# Patient Record
Sex: FEMALE | Race: BLACK OR AFRICAN AMERICAN
[De-identification: names, ages, dates, MRNs, and addresses within clinical notes are randomized per-mention and may not be internally consistent; named-entity substitution may affect disease eponyms.]

---

## 2018-05-17 ENCOUNTER — HOSPITAL ENCOUNTER (INPATIENT)
Dept: HOSPITAL 26 - MED | Age: 58
LOS: 3 days | Discharge: HOME | DRG: 816 | End: 2018-05-20
Payer: MEDICAID

## 2018-05-17 VITALS — SYSTOLIC BLOOD PRESSURE: 198 MMHG | DIASTOLIC BLOOD PRESSURE: 81 MMHG

## 2018-05-17 VITALS — HEIGHT: 63 IN | WEIGHT: 108 LBS | BODY MASS INDEX: 19.14 KG/M2

## 2018-05-17 VITALS — DIASTOLIC BLOOD PRESSURE: 82 MMHG | SYSTOLIC BLOOD PRESSURE: 109 MMHG

## 2018-05-17 DIAGNOSIS — N18.6: ICD-10-CM

## 2018-05-17 DIAGNOSIS — I16.0: ICD-10-CM

## 2018-05-17 DIAGNOSIS — G92: ICD-10-CM

## 2018-05-17 DIAGNOSIS — Z85.3: ICD-10-CM

## 2018-05-17 DIAGNOSIS — I35.1: ICD-10-CM

## 2018-05-17 DIAGNOSIS — N28.1: ICD-10-CM

## 2018-05-17 DIAGNOSIS — Z92.3: ICD-10-CM

## 2018-05-17 DIAGNOSIS — I27.20: ICD-10-CM

## 2018-05-17 DIAGNOSIS — Z98.51: ICD-10-CM

## 2018-05-17 DIAGNOSIS — Z77.22: ICD-10-CM

## 2018-05-17 DIAGNOSIS — Z82.5: ICD-10-CM

## 2018-05-17 DIAGNOSIS — E44.0: ICD-10-CM

## 2018-05-17 DIAGNOSIS — D63.8: ICD-10-CM

## 2018-05-17 DIAGNOSIS — J44.1: ICD-10-CM

## 2018-05-17 DIAGNOSIS — N39.0: ICD-10-CM

## 2018-05-17 DIAGNOSIS — K21.9: ICD-10-CM

## 2018-05-17 DIAGNOSIS — N17.0: ICD-10-CM

## 2018-05-17 DIAGNOSIS — G25.81: ICD-10-CM

## 2018-05-17 DIAGNOSIS — Z80.1: ICD-10-CM

## 2018-05-17 DIAGNOSIS — J96.00: ICD-10-CM

## 2018-05-17 DIAGNOSIS — E83.39: ICD-10-CM

## 2018-05-17 DIAGNOSIS — D64.9: ICD-10-CM

## 2018-05-17 DIAGNOSIS — I07.1: ICD-10-CM

## 2018-05-17 DIAGNOSIS — E83.41: ICD-10-CM

## 2018-05-17 DIAGNOSIS — I50.43: ICD-10-CM
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
## 2018-05-17 DIAGNOSIS — I13.2: ICD-10-CM

## 2018-05-17 DIAGNOSIS — I42.9: ICD-10-CM

## 2018-05-17 DIAGNOSIS — T40.5X1A: Primary | ICD-10-CM

## 2018-05-17 DIAGNOSIS — Z90.12: ICD-10-CM

## 2018-05-17 DIAGNOSIS — I10: ICD-10-CM

## 2018-05-17 DIAGNOSIS — Z99.2: ICD-10-CM

## 2018-05-17 DIAGNOSIS — E87.8: ICD-10-CM

## 2018-05-17 DIAGNOSIS — Z82.49: ICD-10-CM

## 2018-05-17 DIAGNOSIS — E87.6: ICD-10-CM

## 2018-05-17 DIAGNOSIS — E87.5: ICD-10-CM

## 2018-05-17 DIAGNOSIS — I34.0: ICD-10-CM

## 2018-05-17 DIAGNOSIS — I31.3: ICD-10-CM

## 2018-05-17 DIAGNOSIS — Z99.81: ICD-10-CM

## 2018-05-17 DIAGNOSIS — J44.9: ICD-10-CM

## 2018-05-17 DIAGNOSIS — Z90.49: ICD-10-CM

## 2018-05-17 LAB
ALBUMIN FLD-MCNC: 3.8 G/DL (ref 3.4–5)
ANION GAP SERPL CALCULATED.3IONS-SCNC: 7.6 MMOL/L (ref 8–16)
APPEARANCE UR: CLEAR
AST SERPL-CCNC: 15 U/L (ref 15–37)
BARBITURATES UR QL SCN: (no result) NG/ML
BASOPHILS # BLD AUTO: 0 K/UL (ref 0–0.22)
BASOPHILS NFR BLD AUTO: 0.2 % (ref 0–2)
BENZODIAZ UR QL SCN: (no result) NG/ML
BILIRUB SERPL-MCNC: 0.5 MG/DL (ref 0–1)
BILIRUB UR QL STRIP: NEGATIVE
BUN SERPL-MCNC: 16 MG/DL (ref 7–18)
BZE UR QL SCN: (no result) NG/ML
CANNABINOIDS UR QL SCN: (no result) NG/ML
CHLORIDE SERPL-SCNC: 97 MMOL/L (ref 98–107)
CO2 SERPL-SCNC: 38.4 MMOL/L (ref 21–32)
COLOR UR: YELLOW
CREAT SERPL-MCNC: 4.6 MG/DL (ref 0.6–1.3)
EOSINOPHIL # BLD AUTO: 0.2 K/UL (ref 0–0.4)
EOSINOPHIL NFR BLD AUTO: 2.6 % (ref 0–4)
ERYTHROCYTE [DISTWIDTH] IN BLOOD BY AUTOMATED COUNT: 19.7 % (ref 11.6–13.7)
GFR SERPL CREATININE-BSD FRML MDRD: 13 ML/MIN (ref 90–?)
GLUCOSE SERPL-MCNC: 106 MG/DL (ref 74–106)
GLUCOSE UR STRIP-MCNC: (no result) MG/DL
HCT VFR BLD AUTO: 36.7 % (ref 36–48)
HGB BLD-MCNC: 11.7 G/DL (ref 12–16)
HGB UR QL STRIP: (no result)
LEUKOCYTE ESTERASE UR QL STRIP: (no result)
LYMPHOCYTES # BLD AUTO: 1 K/UL (ref 2.5–16.5)
LYMPHOCYTES NFR BLD AUTO: 16.5 % (ref 20.5–51.1)
MCH RBC QN AUTO: 28 PG (ref 27–31)
MCHC RBC AUTO-ENTMCNC: 32 G/DL (ref 33–37)
MCV RBC AUTO: 87.9 FL (ref 80–94)
MONOCYTES # BLD AUTO: 0.4 K/UL (ref 0.8–1)
MONOCYTES NFR BLD AUTO: 6.2 % (ref 1.7–9.3)
NEUTROPHILS # BLD AUTO: 4.4 K/UL (ref 1.8–7.7)
NEUTROPHILS NFR BLD AUTO: 74.5 % (ref 42.2–75.2)
NITRITE UR QL STRIP: NEGATIVE
OPIATES UR QL SCN: (no result) NG/ML
PCP UR QL SCN: (no result) NG/ML
PH UR STRIP: 8.5 [PH] (ref 5–9)
PLATELET # BLD AUTO: 374 K/UL (ref 140–450)
POTASSIUM SERPL-SCNC: 3 MMOL/L (ref 3.5–5.1)
PROTHROMBIN TIME: 9.9 SECS (ref 10.8–13.4)
RBC # BLD AUTO: 4.17 MIL/UL (ref 4.2–5.4)
RBC #/AREA URNS HPF: (no result) /HPF (ref 0–5)
SODIUM SERPL-SCNC: 140 MMOL/L (ref 136–145)
WBC # BLD AUTO: 5.9 K/UL (ref 4.8–10.8)
WBC,URINE: (no result) /HPF (ref 0–5)

## 2018-05-17 NOTE — NUR
ADMITTED 57 F FROM ER TO [facility] BY DEB. PT CC IS SOB SECONDARY TO CHF EXACERBATION. PT VS 
NOTED AS /81, , RR 18, O2 SAT 94% ON NC @3LPM., T 98.8. PT IS ON TELE MONITOR. 
PT AOX4, AMBULATORY WITH NO COMPLAIN OF ANY DISCOMFORT , NO PAIN. HAS LFT UA AV SHUNT . HAS 
IV ACCESS RT WRIST 20G. LINE CLEAR AND PATENT. ORIENTED PT TO HOSPITAL ROUTINE. PLAN OF CARE 
DISCUSSED, VERBALIZED UNDERSTANDING. 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 
BED AT LOW POSITION, CALL LIGHT WITHIN REACH. WILL CONTINUE TO MONITOR.

## 2018-05-17 NOTE — NUR
TELE NURSE SITAL AND BLUE MADE AWARE OF BP. PT ASYMPTOMATIC AT THIS TIME, AOX4. 
ER MD DR. JULES AND DR. DIAZ AWARE. OK TO GO TO TELE.

## 2018-05-17 NOTE — NUR
Patient will be admitted to care of DR. KAPADIA. Admited to TELE.  Will go to 
room 114. Belongings list completed.  Report to BLUE AND SITAL RN.

## 2018-05-17 NOTE — NUR
57Y/F  BIBA FROM DIALYSIS CENTER FOR ACUTE ONSET OF SOB. AWAKE AND ALERT ON 
ARRIVAL. TO BED 11.AAOX4 WITH EVEN AND STEADY GAIT; VSS; PATIENT POSITIONED FOR 
COMFORT; HOB ELEVATED; BEDRAILS UP X1; BED DOWN. ER MD MADE AWARE OF PT STATUS.

## 2018-05-18 VITALS — DIASTOLIC BLOOD PRESSURE: 77 MMHG | SYSTOLIC BLOOD PRESSURE: 178 MMHG

## 2018-05-18 VITALS — DIASTOLIC BLOOD PRESSURE: 79 MMHG | SYSTOLIC BLOOD PRESSURE: 188 MMHG

## 2018-05-18 VITALS — DIASTOLIC BLOOD PRESSURE: 85 MMHG | SYSTOLIC BLOOD PRESSURE: 184 MMHG

## 2018-05-18 VITALS — SYSTOLIC BLOOD PRESSURE: 177 MMHG | DIASTOLIC BLOOD PRESSURE: 96 MMHG

## 2018-05-18 VITALS — SYSTOLIC BLOOD PRESSURE: 170 MMHG | DIASTOLIC BLOOD PRESSURE: 83 MMHG

## 2018-05-18 LAB
AMYLASE SERPL-CCNC: 71 U/L (ref 25–115)
ANION GAP SERPL CALCULATED.3IONS-SCNC: 13.7 MMOL/L (ref 8–16)
BASOPHILS # BLD AUTO: 0 K/UL (ref 0–0.22)
BASOPHILS NFR BLD AUTO: 0.1 % (ref 0–2)
BUN SERPL-MCNC: 26 MG/DL (ref 7–18)
CHLORIDE SERPL-SCNC: 100 MMOL/L (ref 98–107)
CHOLEST/HDLC SERPL: 2.2 {RATIO} (ref 1–4.5)
CO2 SERPL-SCNC: 32.6 MMOL/L (ref 21–32)
CREAT SERPL-MCNC: 6.1 MG/DL (ref 0.6–1.3)
EOSINOPHIL # BLD AUTO: 0 K/UL (ref 0–0.4)
EOSINOPHIL NFR BLD AUTO: 0 % (ref 0–4)
ERYTHROCYTE [DISTWIDTH] IN BLOOD BY AUTOMATED COUNT: 19.2 % (ref 11.6–13.7)
GFR SERPL CREATININE-BSD FRML MDRD: 9 ML/MIN (ref 90–?)
GLUCOSE SERPL-MCNC: 141 MG/DL (ref 74–106)
HCT VFR BLD AUTO: 29.7 % (ref 36–48)
HDLC SERPL-MCNC: 72 MG/DL (ref 40–60)
HGB BLD-MCNC: 9.4 G/DL (ref 12–16)
LDLC SERPL CALC-MCNC: 61 MG/DL (ref 60–100)
LIPASE SERPL-CCNC: 121 U/L (ref 73–393)
LYMPHOCYTES # BLD AUTO: 0.8 K/UL (ref 2.5–16.5)
LYMPHOCYTES NFR BLD AUTO: 12.8 % (ref 20.5–51.1)
MAGNESIUM SERPL-MCNC: 2.1 MG/DL (ref 1.8–2.4)
MAGNESIUM SERPL-MCNC: 2.3 MG/DL (ref 1.8–2.4)
MCH RBC QN AUTO: 28 PG (ref 27–31)
MCHC RBC AUTO-ENTMCNC: 32 G/DL (ref 33–37)
MCV RBC AUTO: 88.2 FL (ref 80–94)
MONOCYTES # BLD AUTO: 0.4 K/UL (ref 0.8–1)
MONOCYTES NFR BLD AUTO: 6.5 % (ref 1.7–9.3)
NEUTROPHILS # BLD AUTO: 5.1 K/UL (ref 1.8–7.7)
NEUTROPHILS NFR BLD AUTO: 80.6 % (ref 42.2–75.2)
PHOSPHATE SERPL-MCNC: 5 MG/DL (ref 2.5–4.9)
PHOSPHATE SERPL-MCNC: 5.5 MG/DL (ref 2.5–4.9)
PLATELET # BLD AUTO: 323 K/UL (ref 140–450)
POTASSIUM SERPL-SCNC: 4.3 MMOL/L (ref 3.5–5.1)
PROTHROMBIN TIME: 11.1 SECS (ref 10.8–13.4)
RBC # BLD AUTO: 3.37 MIL/UL (ref 4.2–5.4)
SODIUM SERPL-SCNC: 142 MMOL/L (ref 136–145)
TRIGL SERPL-MCNC: 125 MG/DL (ref 30–150)
TSH SERPL DL<=0.05 MIU/L-ACNC: 0.09 UIU/ML (ref 0.34–3.74)
WBC # BLD AUTO: 6.3 K/UL (ref 4.8–10.8)

## 2018-05-18 RX ADMIN — IPRATROPIUM BROMIDE AND ALBUTEROL SULFATE SCH ML: .5; 3 SOLUTION RESPIRATORY (INHALATION) at 06:59

## 2018-05-18 RX ADMIN — IPRATROPIUM BROMIDE AND ALBUTEROL SULFATE SCH ML: .5; 3 SOLUTION RESPIRATORY (INHALATION) at 01:15

## 2018-05-18 RX ADMIN — BUDESONIDE SCH MG: 0.25 SUSPENSION RESPIRATORY (INHALATION) at 06:59

## 2018-05-18 RX ADMIN — Medication SCH TAB: at 09:34

## 2018-05-18 RX ADMIN — IPRATROPIUM BROMIDE AND ALBUTEROL SULFATE PRN ML: .5; 3 SOLUTION RESPIRATORY (INHALATION) at 17:03

## 2018-05-18 RX ADMIN — Medication SCH MG: at 09:32

## 2018-05-18 RX ADMIN — CALCIUM ACETATE SCH MG: 667 CAPSULE ORAL at 09:32

## 2018-05-18 RX ADMIN — HEPARIN SODIUM SCH MLS/HR: 5000 INJECTION, SOLUTION INTRAVENOUS at 13:11

## 2018-05-18 RX ADMIN — HEPARIN SODIUM SCH MLS/HR: 5000 INJECTION, SOLUTION INTRAVENOUS at 19:01

## 2018-05-18 RX ADMIN — Medication SCH EACH: at 09:33

## 2018-05-18 RX ADMIN — IPRATROPIUM BROMIDE AND ALBUTEROL SULFATE SCH ML: .5; 3 SOLUTION RESPIRATORY (INHALATION) at 19:24

## 2018-05-18 RX ADMIN — CALCIUM ACETATE SCH MG: 667 CAPSULE ORAL at 17:40

## 2018-05-18 RX ADMIN — HEPARIN SODIUM SCH MLS/HR: 5000 INJECTION, SOLUTION INTRAVENOUS at 00:45

## 2018-05-18 RX ADMIN — Medication SCH MG: at 20:28

## 2018-05-18 RX ADMIN — BUDESONIDE SCH MG: 0.25 SUSPENSION RESPIRATORY (INHALATION) at 19:24

## 2018-05-18 RX ADMIN — MINOXIDIL SCH MG: 10 TABLET ORAL at 09:34

## 2018-05-18 RX ADMIN — CALCIUM ACETATE SCH MG: 667 CAPSULE ORAL at 13:12

## 2018-05-18 RX ADMIN — PANTOPRAZOLE SODIUM SCH MG: 40 TABLET, DELAYED RELEASE ORAL at 09:32

## 2018-05-18 RX ADMIN — FUROSEMIDE SCH MG: 10 INJECTION, SOLUTION INTRAMUSCULAR; INTRAVENOUS at 13:12

## 2018-05-18 RX ADMIN — IPRATROPIUM BROMIDE AND ALBUTEROL SULFATE SCH ML: .5; 3 SOLUTION RESPIRATORY (INHALATION) at 12:42

## 2018-05-18 NOTE — NUR
SPOKE TO JUANIS GONZALEZ AND INFORMED HER ABOUT THE HEMODIALYSIS ORDER FOR THE PATIENT TOMORROW. PER 
JUANIS GONZALEZ, SHE WILL BE HERE TOMORROW BY 0900

## 2018-05-18 NOTE — NUR
PT B/P DURING RETAKE /90. AT THIS TIME PT HAD BEEN UP AND BRUSHING HER TEETH. B/P 
RETAKEN 10 MINUTES LATER AFTER PT HAD CALEB RESTING IN BED, AND RETAKE /81. ATTENDING 
DR NOTIFIED AND ORDERED ANOTHER CATAPRES 0.1MG TO BE GIVEN NOW. WILL CONTINUE TO MONITOR 
B/P. PT IS ASYMPTOMATIC AT THIS TIME WITH NO C/O VOICED.

## 2018-05-18 NOTE — NUR
PATIENT BACK TO THE UNIT. CT NOT DONE AT THIS TIME. PATIENT IS HAVING DIFFICULTY LAYING 
FLAT. NOTIFIED DR GUZMAN AND DR ARORA

## 2018-05-18 NOTE — NUR
ADMINISTERED LISINOPRIL 40 MG ONE TIME DOSE FOR /79,  TO PT AS ORDERED. PT LYING 
ON BED, AT REST. NO SIGN OF DISTRESS. WILL CONTINUE TO MONITOR PT.

## 2018-05-18 NOTE — NUR
PT LATEST /77, , RR 16, O2 SAT 98% ON NC@3LPM, T 98.5. PT RESTING ON BED. PT 
ASKING FOR BREAKFAST , PT NOTIFIED ABOUT THE TIME. PT STABLE AT THIS TIME. WILL CONTINUE TO 
MONITOR.

## 2018-05-18 NOTE — NUR
FAXED INITIAL REVIEW TO [facility]   [phone]   PHONE [phone]

FAXED INITIAL REVIEW TO [facility]  [phone]   PHONE [place] [phone]

## 2018-05-18 NOTE — NUR
RECEIVED REPORT FROM VERENICE ARVIZU AT BEDSIDE. PT SITTING UP IN BED AND RECEIVING BREATHING 
TREATMENTS AT THIS TIME. PT HAS HEPARIN GTT RUNNING AT 1000 UNITS AN HOUR VIA R FARM 20 G IV 
SITE.. PT HAS LFET ARM AV SHUNT WITH THRILL AND BRUIT FELT. PT DUE FOR DIALYSIS TOMORROW 
MORNING. PT ON 2L O2 VIA N/C. PT HAS EVEN AND UNLABORED RESPIRATIONS. SHE HAD NO C/O VOICED 
AT THIS TIME. BED LOW SIDE RAILS UP AND CALL BELL IN REACH.

## 2018-05-18 NOTE — NUR
BP STILL ELEVATED 181/83 ,HR-111 AFTER THE LISINOPRIL. DR. DIAZ MADE AWARE . ALSO AWARE 
ABOUT THE TROPONIN  TRENDING DOWN 0.059 .

## 2018-05-18 NOTE — NUR
PT COMPLAINING OF TOO MUCH PAIN ON RT FA 20G DUE TO K RIDER . REFUSED SECOND BAG OF 20 MEQ 
AT THIS TIME. DR DIAZ MADE AWARE. HE SAYS OKAY TO HOLD IT FOR NOW. MIGHT GIVE IN K 
RIDER LATER IN MORNING WITH LIDOCAINE. HE WAS ALSO MADE AWARE OF /83,  AFTER 
CLONIDINE. WILL DO ORDER.

## 2018-05-18 NOTE — NUR
RECEIVED PATIENT REPORT AT BEDSIDE. PATIENT IS ASLEEP BUT AROUSABLE. NO S/S OF DISTRESS 
NOTED. PATIENT ON 2L O2. NO SOB. PATIENT ON HEPARIN DRIP INFUSING AT 6ML/HR. AV SHUNT NOTED 
TO THE LEFT UPPER ARM. THRILL AND BRUIT NOTED. PATIENT ON TELE MONITORING. BED LOWERED WITH 
CALL LIGHT WITHIN REACH. WILL CONTINUE TO MONITOR

## 2018-05-19 VITALS — SYSTOLIC BLOOD PRESSURE: 147 MMHG | DIASTOLIC BLOOD PRESSURE: 76 MMHG

## 2018-05-19 VITALS — SYSTOLIC BLOOD PRESSURE: 126 MMHG | DIASTOLIC BLOOD PRESSURE: 51 MMHG

## 2018-05-19 VITALS — DIASTOLIC BLOOD PRESSURE: 84 MMHG | SYSTOLIC BLOOD PRESSURE: 178 MMHG

## 2018-05-19 VITALS — SYSTOLIC BLOOD PRESSURE: 174 MMHG | DIASTOLIC BLOOD PRESSURE: 85 MMHG

## 2018-05-19 VITALS — SYSTOLIC BLOOD PRESSURE: 150 MMHG | DIASTOLIC BLOOD PRESSURE: 75 MMHG

## 2018-05-19 VITALS — DIASTOLIC BLOOD PRESSURE: 82 MMHG | SYSTOLIC BLOOD PRESSURE: 164 MMHG

## 2018-05-19 VITALS — SYSTOLIC BLOOD PRESSURE: 163 MMHG | DIASTOLIC BLOOD PRESSURE: 72 MMHG

## 2018-05-19 LAB
ANION GAP SERPL CALCULATED.3IONS-SCNC: 14.7 MMOL/L (ref 8–16)
BASOPHILS # BLD AUTO: 0 K/UL (ref 0–0.22)
BASOPHILS NFR BLD AUTO: 0.1 % (ref 0–2)
BUN SERPL-MCNC: 49 MG/DL (ref 7–18)
CHLORIDE SERPL-SCNC: 96 MMOL/L (ref 98–107)
CO2 SERPL-SCNC: 32.8 MMOL/L (ref 21–32)
CREAT SERPL-MCNC: 7.9 MG/DL (ref 0.6–1.3)
EOSINOPHIL # BLD AUTO: 0 K/UL (ref 0–0.4)
EOSINOPHIL NFR BLD AUTO: 0 % (ref 0–4)
ERYTHROCYTE [DISTWIDTH] IN BLOOD BY AUTOMATED COUNT: 19.2 % (ref 11.6–13.7)
GFR SERPL CREATININE-BSD FRML MDRD: 7 ML/MIN (ref 90–?)
GLUCOSE SERPL-MCNC: 145 MG/DL (ref 74–106)
HCT VFR BLD AUTO: 32 % (ref 36–48)
HGB BLD-MCNC: 10 G/DL (ref 12–16)
LYMPHOCYTES # BLD AUTO: 0.5 K/UL (ref 2.5–16.5)
LYMPHOCYTES NFR BLD AUTO: 6.5 % (ref 20.5–51.1)
MAGNESIUM SERPL-MCNC: 2.5 MG/DL (ref 1.8–2.4)
MCH RBC QN AUTO: 27 PG (ref 27–31)
MCHC RBC AUTO-ENTMCNC: 31 G/DL (ref 33–37)
MCV RBC AUTO: 87.9 FL (ref 80–94)
MONOCYTES # BLD AUTO: 0.2 K/UL (ref 0.8–1)
MONOCYTES NFR BLD AUTO: 1.9 % (ref 1.7–9.3)
NEUTROPHILS # BLD AUTO: 7.5 K/UL (ref 1.8–7.7)
NEUTROPHILS NFR BLD AUTO: 91.5 % (ref 42.2–75.2)
PHOSPHATE SERPL-MCNC: 8.2 MG/DL (ref 2.5–4.9)
PLATELET # BLD AUTO: 386 K/UL (ref 140–450)
POTASSIUM SERPL-SCNC: 5.5 MMOL/L (ref 3.5–5.1)
RBC # BLD AUTO: 3.64 MIL/UL (ref 4.2–5.4)
SODIUM SERPL-SCNC: 138 MMOL/L (ref 136–145)
T3RU NFR SERPL: 24 % (ref 24–39)
T4 SERPL-MCNC: 8.5 UG/DL (ref 4.5–12)
WBC # BLD AUTO: 8.2 K/UL (ref 4.8–10.8)

## 2018-05-19 PROCEDURE — 5A1D70Z PERFORMANCE OF URINARY FILTRATION, INTERMITTENT, LESS THAN 6 HOURS PER DAY: ICD-10-PCS

## 2018-05-19 RX ADMIN — Medication SCH MG: at 08:14

## 2018-05-19 RX ADMIN — CALCIUM ACETATE SCH MG: 667 CAPSULE ORAL at 17:00

## 2018-05-19 RX ADMIN — Medication SCH MG: at 20:37

## 2018-05-19 RX ADMIN — MINOXIDIL SCH MG: 10 TABLET ORAL at 09:00

## 2018-05-19 RX ADMIN — LISINOPRIL SCH MG: 20 TABLET ORAL at 09:00

## 2018-05-19 RX ADMIN — IPRATROPIUM BROMIDE AND ALBUTEROL SULFATE SCH ML: .5; 3 SOLUTION RESPIRATORY (INHALATION) at 00:31

## 2018-05-19 RX ADMIN — HEPARIN SODIUM SCH MLS/HR: 5000 INJECTION, SOLUTION INTRAVENOUS at 08:25

## 2018-05-19 RX ADMIN — FUROSEMIDE SCH MG: 10 INJECTION, SOLUTION INTRAMUSCULAR; INTRAVENOUS at 08:15

## 2018-05-19 RX ADMIN — Medication SCH MG: at 09:00

## 2018-05-19 RX ADMIN — PANTOPRAZOLE SODIUM SCH MG: 40 TABLET, DELAYED RELEASE ORAL at 08:14

## 2018-05-19 RX ADMIN — BUDESONIDE SCH MG: 0.25 SUSPENSION RESPIRATORY (INHALATION) at 06:37

## 2018-05-19 RX ADMIN — CALCIUM ACETATE SCH MG: 667 CAPSULE ORAL at 08:13

## 2018-05-19 RX ADMIN — HEPARIN SODIUM SCH MLS/HR: 5000 INJECTION, SOLUTION INTRAVENOUS at 00:53

## 2018-05-19 RX ADMIN — IPRATROPIUM BROMIDE AND ALBUTEROL SULFATE SCH ML: .5; 3 SOLUTION RESPIRATORY (INHALATION) at 12:02

## 2018-05-19 RX ADMIN — IPRATROPIUM BROMIDE AND ALBUTEROL SULFATE SCH ML: .5; 3 SOLUTION RESPIRATORY (INHALATION) at 19:39

## 2018-05-19 RX ADMIN — BUDESONIDE SCH MG: 0.25 SUSPENSION RESPIRATORY (INHALATION) at 19:40

## 2018-05-19 RX ADMIN — IPRATROPIUM BROMIDE AND ALBUTEROL SULFATE PRN ML: .5; 3 SOLUTION RESPIRATORY (INHALATION) at 16:43

## 2018-05-19 RX ADMIN — IPRATROPIUM BROMIDE AND ALBUTEROL SULFATE SCH ML: .5; 3 SOLUTION RESPIRATORY (INHALATION) at 06:36

## 2018-05-19 RX ADMIN — Medication SCH TAB: at 08:13

## 2018-05-19 RX ADMIN — CALCIUM ACETATE SCH MG: 667 CAPSULE ORAL at 12:44

## 2018-05-19 RX ADMIN — Medication SCH EACH: at 08:14

## 2018-05-19 NOTE — NUR
LAB CALLED  TO REPORT CRITICAL VALUE CREATINE CLEARANCE OF 7.9 H. PT IS ON DIALYSIS AND WILL 
BE GETTING DIALYSIS THIS AM. CHARGE NURSE AWARE

## 2018-05-19 NOTE — NUR
DIALYSIS NURSE IS HERE. PT RESTING. NO SOB NOTED. NO COMPLAINTS MADE. WILL ENDORSE TO NEXT 
SHIFT NURSE FOR CONTINUITY OF CARE.

## 2018-05-19 NOTE — NUR
RECEIVED PATIENT ON BED WITH ON GOING DIALYSIS. FALL PRECAUTION IMPLEMENTED. CALL LIGHT 
WITHIN REACH. DISCUSS PLAN OF CARE TO PATIENT. WILL CONTINUE TO MONITOR.

## 2018-05-19 NOTE — NUR
CALLED JUANIS FROM ACUTE DIALYSIS TO FOLLOW UP WITH THE HD SCHED, STATED THE NURSE WILL BE 
HERE SOON.

## 2018-05-19 NOTE — NUR
RECEIVED ON BED AAOX4. NO SOB NOTED. NO C/O PAIN AT THIS TIME. IV TO RT HAND PATENT AND 
INTACT. WITH DEEDEE AV SHUNT, THRILL AND BRUITS FELT. PT IS FOR HD TODAY.  CHEST DIMINISHED AIR 
ENTRY TO THE BASES, WHEEZING HEARD BILATERALLY. ABDOMEN SOFT, BOWEL SOUNDS PRESENT.NO EDEMA 
NOTED.  INSTRUCTED PT TO CALL FOR ASSISTANCE, CALL LIGHT WITHIN REACH, PT VERBALIZED 
UNDERSTANDING.

## 2018-05-19 NOTE — NUR
DUE MEDS GIVEN WITH CONSENT OF DIALYSIS RN. NO S/S OF DISTRESS NOTED AT THIS TIME. WILL 
CONTINUE TO MONITOR.

## 2018-05-19 NOTE — NUR
FOLLOWED UP MOON FROM ACUTE HD ([phone]) REGARDING PT'S HD SCHEDULE TODAY. OLIVAREZ STATED 
THAT THE HD NURSE WILL BE HERE AROUND 4 PM TODAY. WILL NOTIFY PT.

## 2018-05-19 NOTE — NUR
PT APTT RESULT WAS 37.3 HEPARIN BOLUS OF 1500 WAS GIVEN AND DOSAGE ADJUSTED TO 1100 
UNITS/HR. PT SITTING UP IN BED WITH NO C/O VOICED AT THIS TIME. BED IN LOW POSITION 2 SIDE 
RAILS UP AND CALL BELL IN REACH.

## 2018-05-19 NOTE — NUR
SEEN PATIENT ASLEEP ON BED.  FALL PRECAUTION IMPLEMENTED. CALL LIGHT WITHIN REACH. NO S/S OF 
DISTRESS NOTED AT THIS TIME. WILL CONTINUE TO MONITOR.

## 2018-05-20 VITALS — SYSTOLIC BLOOD PRESSURE: 156 MMHG | DIASTOLIC BLOOD PRESSURE: 72 MMHG

## 2018-05-20 VITALS — SYSTOLIC BLOOD PRESSURE: 134 MMHG | DIASTOLIC BLOOD PRESSURE: 65 MMHG

## 2018-05-20 VITALS — DIASTOLIC BLOOD PRESSURE: 65 MMHG | SYSTOLIC BLOOD PRESSURE: 134 MMHG

## 2018-05-20 VITALS — SYSTOLIC BLOOD PRESSURE: 147 MMHG | DIASTOLIC BLOOD PRESSURE: 51 MMHG

## 2018-05-20 LAB
ALBUMIN FLD-MCNC: 3 G/DL (ref 3.4–5)
ANION GAP SERPL CALCULATED.3IONS-SCNC: 12.1 MMOL/L (ref 8–16)
AST SERPL-CCNC: 6 U/L (ref 15–37)
BASOPHILS # BLD AUTO: 0 K/UL (ref 0–0.22)
BASOPHILS NFR BLD AUTO: 0.1 % (ref 0–2)
BILIRUB SERPL-MCNC: 0.3 MG/DL (ref 0–1)
BUN SERPL-MCNC: 29 MG/DL (ref 7–18)
CHLORIDE SERPL-SCNC: 101 MMOL/L (ref 98–107)
CO2 SERPL-SCNC: 32.3 MMOL/L (ref 21–32)
CREAT SERPL-MCNC: 5.1 MG/DL (ref 0.6–1.3)
EOSINOPHIL # BLD AUTO: 0 K/UL (ref 0–0.4)
EOSINOPHIL NFR BLD AUTO: 0 % (ref 0–4)
ERYTHROCYTE [DISTWIDTH] IN BLOOD BY AUTOMATED COUNT: 19.2 % (ref 11.6–13.7)
GFR SERPL CREATININE-BSD FRML MDRD: 11 ML/MIN (ref 90–?)
GLUCOSE SERPL-MCNC: 129 MG/DL (ref 74–106)
HCT VFR BLD AUTO: 28.8 % (ref 36–48)
HGB BLD-MCNC: 9.1 G/DL (ref 12–16)
LYMPHOCYTES # BLD AUTO: 0.4 K/UL (ref 2.5–16.5)
LYMPHOCYTES NFR BLD AUTO: 4.3 % (ref 20.5–51.1)
MAGNESIUM SERPL-MCNC: 1.9 MG/DL (ref 1.8–2.4)
MCH RBC QN AUTO: 28 PG (ref 27–31)
MCHC RBC AUTO-ENTMCNC: 32 G/DL (ref 33–37)
MCV RBC AUTO: 88.6 FL (ref 80–94)
MONOCYTES # BLD AUTO: 0.3 K/UL (ref 0.8–1)
MONOCYTES NFR BLD AUTO: 3 % (ref 1.7–9.3)
NEUTROPHILS # BLD AUTO: 8 K/UL (ref 1.8–7.7)
NEUTROPHILS NFR BLD AUTO: 92.6 % (ref 42.2–75.2)
PHOSPHATE SERPL-MCNC: 6.7 MG/DL (ref 2.5–4.9)
PLATELET # BLD AUTO: 358 K/UL (ref 140–450)
POTASSIUM SERPL-SCNC: 4.4 MMOL/L (ref 3.5–5.1)
RBC # BLD AUTO: 3.25 MIL/UL (ref 4.2–5.4)
SODIUM SERPL-SCNC: 141 MMOL/L (ref 136–145)
WBC # BLD AUTO: 8.7 K/UL (ref 4.8–10.8)

## 2018-05-20 RX ADMIN — IPRATROPIUM BROMIDE AND ALBUTEROL SULFATE SCH ML: .5; 3 SOLUTION RESPIRATORY (INHALATION) at 06:45

## 2018-05-20 RX ADMIN — Medication SCH TAB: at 10:36

## 2018-05-20 RX ADMIN — FUROSEMIDE SCH MG: 10 INJECTION, SOLUTION INTRAMUSCULAR; INTRAVENOUS at 08:55

## 2018-05-20 RX ADMIN — CALCIUM ACETATE SCH MG: 667 CAPSULE ORAL at 12:37

## 2018-05-20 RX ADMIN — IPRATROPIUM BROMIDE AND ALBUTEROL SULFATE SCH ML: .5; 3 SOLUTION RESPIRATORY (INHALATION) at 12:50

## 2018-05-20 RX ADMIN — Medication SCH MG: at 10:35

## 2018-05-20 RX ADMIN — LISINOPRIL SCH MG: 20 TABLET ORAL at 10:36

## 2018-05-20 RX ADMIN — PANTOPRAZOLE SODIUM SCH MG: 40 TABLET, DELAYED RELEASE ORAL at 10:40

## 2018-05-20 RX ADMIN — CALCIUM ACETATE SCH MG: 667 CAPSULE ORAL at 08:55

## 2018-05-20 RX ADMIN — IPRATROPIUM BROMIDE AND ALBUTEROL SULFATE SCH ML: .5; 3 SOLUTION RESPIRATORY (INHALATION) at 01:39

## 2018-05-20 RX ADMIN — Medication SCH EACH: at 10:36

## 2018-05-20 RX ADMIN — BUDESONIDE SCH MG: 0.25 SUSPENSION RESPIRATORY (INHALATION) at 06:49

## 2018-05-20 RX ADMIN — MINOXIDIL SCH MG: 10 TABLET ORAL at 10:35

## 2018-05-20 NOTE — NUR
RECEIVED BEDSIDE REPORT FROM NIGHT SHIFT NURSE. PATIENT IS SLEEPING,. NO SIGNS OF RESP 
DISTRESS ON 2L NC. SHE HAS DIALYSIS YESTERDAY, OUTPUT OF 2L. LUE FISTULA, ID BAND ON L ARM 
FOR RESTRICTIONS AND SIGN POSTED. SHE IS EASILY AROUSABLE ALERT AND ORIENTEDX4.  SHE IS 
AMBULATORY, SKIN IS INTACT. TELE MONITOR IN PLACE. R WRIST 20 TKO NS. IV IS CLEAN, DRY AND 
INTACT. WILL CONTINUE TO MONITOR THE PATIENT. BED IN LOW POSITION. CALL LIGHT WITHIN REACH.

## 2018-05-20 NOTE — NUR
SEEN PATIENT ASLEEP ON BED. FALL PRECAUTION IMPLEMENTED.CALL LIGHT WITHIN REACH. NO S/S OF 
DISTRESS NOTED AT THIS TIME.

## 2018-05-20 NOTE — NUR
STUDENT NURSE PASSED MEDS WITH PROFESSOR. PATIENT TOLERATED WELL. WILL CONTINUE TO MONITOR 
THE PATIENT.

## 2018-05-20 NOTE — NUR
SEEN PATIENT ASLEEP ON BED BUT EASILY AROUSABLE. CALL LIGHT WITHIN REACH. WILL CONTINUE TO 
MONITOR.

## 2018-05-20 NOTE — NUR
EDUCATED PATIENT ON DISEASE PROCESS, ABN S/SX AND WHEN TO GO TO THE NEAREST ER, EDUCATED ON 
MEDS AND GAVE HER PRESCRIPTIONS. TOLD HER TO F/U WITH PCP WITHIN 3DAYS. ALL QUESTIONS 
ANSWERED. ALL PAPERWORK SIGNED. PATIENT VERBALIZED UNDERSTANDING. STUDENT NURSE WILL 
ADMINISTER PNA VACCINE BEFORE D/C. REMOVED ID BANDS, REMOVED IV, IV CATH INTACT. WILL REMOVE 
TELE MONITOR WHEN THE PATIENT LEAVES. WILL CONTINUE TO MONITOR, SHE IS AWAITING HER RIDE.